# Patient Record
Sex: FEMALE | Race: WHITE | NOT HISPANIC OR LATINO | Employment: FULL TIME | URBAN - METROPOLITAN AREA
[De-identification: names, ages, dates, MRNs, and addresses within clinical notes are randomized per-mention and may not be internally consistent; named-entity substitution may affect disease eponyms.]

---

## 2018-11-28 ENCOUNTER — OFFICE VISIT (OUTPATIENT)
Dept: PRIMARY CARE CLINIC | Facility: CLINIC | Age: 43
End: 2018-11-28
Payer: COMMERCIAL

## 2018-11-28 VITALS
DIASTOLIC BLOOD PRESSURE: 88 MMHG | HEART RATE: 61 BPM | OXYGEN SATURATION: 98 % | RESPIRATION RATE: 18 BRPM | TEMPERATURE: 98 F | BODY MASS INDEX: 31.24 KG/M2 | WEIGHT: 187.5 LBS | SYSTOLIC BLOOD PRESSURE: 136 MMHG | HEIGHT: 65 IN

## 2018-11-28 DIAGNOSIS — G44.89 CHRONIC MIXED HEADACHE SYNDROME: ICD-10-CM

## 2018-11-28 DIAGNOSIS — G43.109 MIGRAINE WITH AURA AND WITHOUT STATUS MIGRAINOSUS, NOT INTRACTABLE: ICD-10-CM

## 2018-11-28 DIAGNOSIS — Z00.00 ROUTINE PHYSICAL EXAMINATION: ICD-10-CM

## 2018-11-28 DIAGNOSIS — Z13.6 ENCOUNTER FOR SCREENING FOR CARDIOVASCULAR DISORDERS: ICD-10-CM

## 2018-11-28 DIAGNOSIS — F41.9 ANXIETY: ICD-10-CM

## 2018-11-28 DIAGNOSIS — F33.0 MILD EPISODE OF RECURRENT MAJOR DEPRESSIVE DISORDER: Primary | ICD-10-CM

## 2018-11-28 DIAGNOSIS — Z12.31 ENCOUNTER FOR SCREENING MAMMOGRAM FOR BREAST CANCER: ICD-10-CM

## 2018-11-28 PROCEDURE — 99999 PR PBB SHADOW E&M-EST. PATIENT-LVL IV: CPT | Mod: PBBFAC,,, | Performed by: NURSE PRACTITIONER

## 2018-11-28 PROCEDURE — 99214 OFFICE O/P EST MOD 30 MIN: CPT | Mod: S$GLB,,, | Performed by: NURSE PRACTITIONER

## 2018-11-28 PROCEDURE — 3008F BODY MASS INDEX DOCD: CPT | Mod: CPTII,S$GLB,, | Performed by: NURSE PRACTITIONER

## 2018-11-28 RX ORDER — CLONAZEPAM 0.5 MG/1
0.5 TABLET ORAL 2 TIMES DAILY PRN
Qty: 30 TABLET | Refills: 0 | Status: SHIPPED | OUTPATIENT
Start: 2018-11-28 | End: 2020-07-30

## 2018-11-28 RX ORDER — SERTRALINE HYDROCHLORIDE 50 MG/1
50 TABLET, FILM COATED ORAL DAILY
Qty: 30 TABLET | Refills: 11 | Status: SHIPPED | OUTPATIENT
Start: 2018-11-28 | End: 2019-01-03

## 2018-11-28 RX ORDER — BUTALBITAL, ACETAMINOPHEN AND CAFFEINE 50; 325; 40 MG/1; MG/1; MG/1
1 TABLET ORAL EVERY 4 HOURS PRN
Qty: 30 TABLET | Refills: 0 | Status: SHIPPED | OUTPATIENT
Start: 2018-11-28 | End: 2018-12-28

## 2018-11-28 NOTE — PATIENT INSTRUCTIONS
Take half to 1 tablet of Klonopin nightly for sleep.  Get on a good sleep scheduled.  Go to sleep this same time every night waking up at the same time every morning.  Avoid caffeine after noon.  Start regular exercise routine at least 30 min a day 4 days a week.  Start Zoloft now.  Follow up in 2 weeks again see her feeling.    Fioricet for headaches-take only as needed.  Avoid taking daily in alternate with Excedrin migraine to prevent development of rebound headaches.  Complete CT of the head.  I will get back to you with the results within 24 hr of completion.

## 2018-11-28 NOTE — PROGRESS NOTES
Chief Complaint  Chief Complaint   Patient presents with    Annual Exam    Establish Care    Anxiety    Migraine       HPI  Florence Morin is a 43 y.o. female with multiple medical diagnoses as listed in the medical history and problem list that presents for dysphoric mood, anxiety, and worsening headaches.  Patient wishing to establish care at this time.  Due for mammogram.  No interest in flu vaccine at this time.    Anxiety, dysphoria-patient reports the onset of symptoms approximately 14 years ago after her  passed away suddenly.  With under the care of Psychiatry that time started on Lexapro and Ativan to be taken as needed.  States that she stopped due to the medication making her feel too sleepy.  Stopped at that time and reported management of her symptoms through coping mechanisms such as walking, getting out house, exercise.  Patient with a good support system.  Reports new onset of symptoms approximately 2 months ago with worsening of her mother who is currently dying of Alzheimer's disease. Symptoms worsening over the last 2 months.  States that she cannot sleep at all.  Difficulty initiating sleep and maintaining sleep.  Frequent waking him sleeps a total of 4 hr per night.  Reports intermittent dysphoria, crying.  Reports anhedonia withdrawal.  No panic attacks.  Has started going to the gym 4 days a week with no improvement symptoms.  Denies suicidal homicidal ideation.    Migraines-patient with history of migraines last migraine approximately 1 year ago into the occurrence of serial migraines over the past week.  States that the migraine qualities have changed and now they are triggered by smells.  Associated vision loss, blurred vision.  Left-sided primarily.  Associated nausea and vomiting.  Worsening over the past 2 days.  Has been taking ibuprofen over-the-counter with no improvement headaches.  Patient states that 1 year ago when she had the headache she went to the emergency room  because she thought she was having a stroke.  A CT was completed at that time was found to be normal.      PAST MEDICAL HISTORY:  History reviewed. No pertinent past medical history.    PAST SURGICAL HISTORY:  History reviewed. No pertinent surgical history.    SOCIAL HISTORY:  Social History     Socioeconomic History    Marital status: Single     Spouse name: Not on file    Number of children: Not on file    Years of education: Not on file    Highest education level: Not on file   Social Needs    Financial resource strain: Not on file    Food insecurity - worry: Not on file    Food insecurity - inability: Not on file    Transportation needs - medical: Not on file    Transportation needs - non-medical: Not on file   Occupational History    Not on file   Tobacco Use    Smoking status: Never Smoker   Substance and Sexual Activity    Alcohol use: No    Drug use: No    Sexual activity: Yes     Partners: Male   Other Topics Concern    Not on file   Social History Narrative    Not on file       FAMILY HISTORY:  Family History   Problem Relation Age of Onset    Ovarian cancer Cousin     Breast cancer Neg Hx     Colon cancer Neg Hx        ALLERGIES AND MEDICATIONS: updated and reviewed.  Review of patient's allergies indicates:  No Known Allergies  Current Outpatient Medications   Medication Sig Dispense Refill    butalbital-acetaminophen-caffeine -40 mg (FIORICET, ESGIC) -40 mg per tablet Take 1 tablet by mouth every 4 (four) hours as needed for Pain. 30 tablet 0    clonazePAM (KLONOPIN) 0.5 MG tablet Take 1 tablet (0.5 mg total) by mouth 2 (two) times daily as needed for Anxiety. 30 tablet 0    sertraline (ZOLOFT) 50 MG tablet Take 1 tablet (50 mg total) by mouth once daily. 30 tablet 11     No current facility-administered medications for this visit.          ROS  Review of Systems   Constitutional: Positive for activity change and unexpected weight change.   HENT: Negative for hearing  "loss, rhinorrhea and trouble swallowing.    Eyes: Positive for visual disturbance. Negative for discharge.   Respiratory: Positive for chest tightness. Negative for wheezing.    Cardiovascular: Positive for chest pain and palpitations.   Gastrointestinal: Positive for blood in stool and constipation. Negative for diarrhea and vomiting.   Endocrine: Negative for polydipsia and polyuria.   Genitourinary: Positive for menstrual problem. Negative for difficulty urinating, dysuria and hematuria.   Musculoskeletal: Positive for arthralgias and neck pain. Negative for joint swelling.   Neurological: Positive for weakness and headaches.   Psychiatric/Behavioral: Positive for dysphoric mood. Negative for confusion.         PHYSICAL EXAM  Vitals:    11/28/18 1404   BP: 136/88   BP Location: Right arm   Patient Position: Sitting   BP Method: Medium (Automatic)   Pulse: 61   Resp: 18   Temp: 98.3 °F (36.8 °C)   TempSrc: Oral   SpO2: 98%   Weight: 85 kg (187 lb 8 oz)   Height: 5' 5" (1.651 m)    Body mass index is 31.2 kg/m².  Weight: 85 kg (187 lb 8 oz)   Height: 5' 5" (165.1 cm)     Physical Exam   Constitutional: She is oriented to person, place, and time. She appears well-developed and well-nourished.   HENT:   Head: Normocephalic.   Right Ear: Tympanic membrane normal.   Left Ear: Tympanic membrane normal.   Mouth/Throat: Uvula is midline, oropharynx is clear and moist and mucous membranes are normal.   Eyes: Conjunctivae are normal.   Cardiovascular: Normal rate, regular rhythm, normal heart sounds and normal pulses.   No murmur heard.  Pulses:       Radial pulses are 2+ on the right side, and 2+ on the left side.   Pulmonary/Chest: Effort normal and breath sounds normal. She has no wheezes.   Abdominal: Soft. Bowel sounds are normal. There is no tenderness.   Musculoskeletal: She exhibits no edema.   Lymphadenopathy:     She has no cervical adenopathy.   Neurological: She is alert and oriented to person, place, and time. " She has normal strength. No cranial nerve deficit or sensory deficit. GCS eye subscore is 4. GCS verbal subscore is 5. GCS motor subscore is 6.   Skin: Skin is warm and dry. No rash noted.   Psychiatric: Her affect is labile. She exhibits a depressed mood.         Health Maintenance       Date Due Completion Date    TETANUS VACCINE 09/20/1995 ---    Mammogram 09/20/2017 ---    Influenza Vaccine 08/01/2018 ---    Pap Smear with HPV Cotest 05/25/2020 5/25/2017            Assessment & Plan    Florence was seen today for annual exam, establish care, anxiety and migraine.    Diagnoses and all orders for this visit:    Mild episode of recurrent major depressive disorder  -     sertraline (ZOLOFT) 50 MG tablet; Take 1 tablet (50 mg total) by mouth once daily.        -     clonazePAM (KLONOPIN) 0.5 MG tablet; Take 1 tablet (0.5 mg total) by mouth 2 (two) times daily as needed for Anxiety.  Take Klonopin nightly as needed for sleep.  Practice good sleep hygiene-go to sleep the same time every night, wake up at the same time every morning.  Avoid caffeine in the afternoon.  Avoid exercising in the late evening. I have discussed the common side effects of this medication and black box warnings (if applicable to this medication) with the patient and answered all of the questions they had at the time of this visit regarding this medication.  Follow-up in 2 weeks for re-evaluation of mood.    Chronic mixed headache syndrome    Migraine with aura and without status migrainosus, not intractable  -     CT Head Without Contrast; Future  -     butalbital-acetaminophen-caffeine -40 mg (FIORICET, ESGIC) -40 mg per tablet; Take 1 tablet by mouth every 4 (four) hours as needed for Pain. Instructed patient to avoid taking Fioricet daily and alternate with ibuprofen and/or Excedrin migraine.    Encounter for screening mammogram for breast cancer  -     Mammo Digital Screening Bilat without CA; Future    Anxiety    Routine  physical examination  -     CBC auto differential; Future  -     Comprehensive metabolic panel; Future  -     TSH; Future    Encounter for screening for cardiovascular disorders  -     Lipid panel; Future          Follow-up: Follow-up in about 2 weeks (around 12/12/2018).

## 2018-12-03 ENCOUNTER — PATIENT MESSAGE (OUTPATIENT)
Dept: PRIMARY CARE CLINIC | Facility: CLINIC | Age: 43
End: 2018-12-03

## 2018-12-03 DIAGNOSIS — F33.1 MODERATE EPISODE OF RECURRENT MAJOR DEPRESSIVE DISORDER: ICD-10-CM

## 2018-12-03 DIAGNOSIS — G47.00 INSOMNIA, UNSPECIFIED TYPE: Primary | ICD-10-CM

## 2018-12-03 PROBLEM — F33.9 EPISODE OF RECURRENT MAJOR DEPRESSIVE DISORDER: Status: ACTIVE | Noted: 2018-12-03

## 2018-12-03 RX ORDER — TRAZODONE HYDROCHLORIDE 50 MG/1
50 TABLET ORAL NIGHTLY
Qty: 30 TABLET | Refills: 11 | Status: SHIPPED | OUTPATIENT
Start: 2018-12-03 | End: 2020-07-30

## 2018-12-11 ENCOUNTER — TELEPHONE (OUTPATIENT)
Dept: PRIMARY CARE CLINIC | Facility: CLINIC | Age: 43
End: 2018-12-11

## 2018-12-11 NOTE — TELEPHONE ENCOUNTER
----- Message from Melania Martin sent at 12/11/2018  2:13 PM CST -----  Type:  Patient Returning Call    Who Called:  Patient   Who Left Message for Patient:  Cristopher  Does the patient know what this is regarding?:  results  Best Call Back Number:  [phone]  Additional Information:  Please call after 3pm when patient is off work    Thank you

## 2019-01-03 ENCOUNTER — TELEPHONE (OUTPATIENT)
Dept: PRIMARY CARE CLINIC | Facility: CLINIC | Age: 44
End: 2019-01-03

## 2019-01-03 ENCOUNTER — PATIENT MESSAGE (OUTPATIENT)
Dept: PRIMARY CARE CLINIC | Facility: CLINIC | Age: 44
End: 2019-01-03

## 2019-01-03 RX ORDER — PAROXETINE HYDROCHLORIDE 20 MG/1
20 TABLET, FILM COATED ORAL EVERY MORNING
Qty: 30 TABLET | Refills: 11 | Status: SHIPPED | OUTPATIENT
Start: 2019-01-03 | End: 2020-07-30

## 2020-07-03 DIAGNOSIS — Z12.31 ENCOUNTER FOR SCREENING MAMMOGRAM FOR BREAST CANCER: ICD-10-CM

## 2020-07-30 ENCOUNTER — TELEPHONE (OUTPATIENT)
Dept: PSYCHOLOGY | Facility: CLINIC | Age: 45
End: 2020-07-30

## 2020-07-30 ENCOUNTER — OFFICE VISIT (OUTPATIENT)
Dept: PRIMARY CARE CLINIC | Facility: CLINIC | Age: 45
End: 2020-07-30
Payer: MEDICAID

## 2020-07-30 VITALS
HEIGHT: 65 IN | SYSTOLIC BLOOD PRESSURE: 122 MMHG | DIASTOLIC BLOOD PRESSURE: 88 MMHG | OXYGEN SATURATION: 99 % | HEART RATE: 62 BPM | TEMPERATURE: 98 F | RESPIRATION RATE: 16 BRPM | WEIGHT: 205.25 LBS | BODY MASS INDEX: 34.2 KG/M2

## 2020-07-30 DIAGNOSIS — S16.1XXA ACUTE STRAIN OF NECK MUSCLE, INITIAL ENCOUNTER: ICD-10-CM

## 2020-07-30 DIAGNOSIS — F33.1 MODERATE EPISODE OF RECURRENT MAJOR DEPRESSIVE DISORDER: ICD-10-CM

## 2020-07-30 DIAGNOSIS — F43.21 GRIEF: Primary | ICD-10-CM

## 2020-07-30 PROCEDURE — 99999 PR PBB SHADOW E&M-EST. PATIENT-LVL V: ICD-10-PCS | Mod: PBBFAC,,, | Performed by: NURSE PRACTITIONER

## 2020-07-30 PROCEDURE — 99214 OFFICE O/P EST MOD 30 MIN: CPT | Mod: S$PBB,,, | Performed by: NURSE PRACTITIONER

## 2020-07-30 PROCEDURE — 99215 OFFICE O/P EST HI 40 MIN: CPT | Mod: PBBFAC,PN | Performed by: NURSE PRACTITIONER

## 2020-07-30 PROCEDURE — 99214 PR OFFICE/OUTPT VISIT, EST, LEVL IV, 30-39 MIN: ICD-10-PCS | Mod: S$PBB,,, | Performed by: NURSE PRACTITIONER

## 2020-07-30 PROCEDURE — 99999 PR PBB SHADOW E&M-EST. PATIENT-LVL V: CPT | Mod: PBBFAC,,, | Performed by: NURSE PRACTITIONER

## 2020-07-30 RX ORDER — CLONAZEPAM 0.5 MG/1
0.5 TABLET ORAL 2 TIMES DAILY PRN
Qty: 30 TABLET | Refills: 2 | Status: SHIPPED | OUTPATIENT
Start: 2020-07-30 | End: 2021-03-19

## 2020-07-30 RX ORDER — NAPROXEN 500 MG/1
500 TABLET ORAL 2 TIMES DAILY
Qty: 28 TABLET | Refills: 0 | Status: SHIPPED | OUTPATIENT
Start: 2020-07-30

## 2020-07-30 RX ORDER — CYCLOBENZAPRINE HCL 5 MG
5 TABLET ORAL 3 TIMES DAILY PRN
Qty: 30 TABLET | Refills: 0 | Status: SHIPPED | OUTPATIENT
Start: 2020-07-30 | End: 2020-08-09

## 2020-07-30 RX ORDER — PAROXETINE HYDROCHLORIDE 20 MG/1
20 TABLET, FILM COATED ORAL EVERY MORNING
Qty: 30 TABLET | Refills: 2 | Status: SHIPPED | OUTPATIENT
Start: 2020-07-30 | End: 2021-03-19

## 2020-07-30 NOTE — PROGRESS NOTES
"Chief Complaint  Chief Complaint   Patient presents with    Anxiety     Patient recently lost mother to COVID.     Tingling     Patient reports tingling to bilateral arms since fall x 3 months ago     Fatigue       HPI    Florence Morin is a 44 y.o. female that presents for anxiety.    Patient recently lost her mother early in July from COVID 19. Has a history of depression and anxiety in the past was perviously on Paxil and klonopin but had to stop her paxil 20 mg and klonopin due to insurance lapse. Generally felt better at the time and felt okay to stop. Does report owrsening of mood after getting off of the paxil. Increased rage. Super irritable and continued as such through the year. Presents to clinic today reporting she is "all over the place". Feels overwhelmed. Disorganized in thoughts. Hopeless. Dealing with dypshoria. Cannot rest and relax at all. Has been smoking marijuana to go to sleep and sleeping 4-5 hours per night. No panic attacks. Lives with her boyfriend who is a good support system. Goes for walks with no improvement. Dad currently sick with COVID in the hospital with increased stressors. Reports that paxil offered good control of her anxiety in the past. Was able to focus better at the time, was much more functional. Cannot get out of bed due to lack of motivation. Patient denies SI/HI. No previous side effects on paxil but does report weight gain.    PAST MEDICAL HISTORY:  Past Medical History:   Diagnosis Date    Anxiety        PAST SURGICAL HISTORY:  History reviewed. No pertinent surgical history.    SOCIAL HISTORY:  Social History     Socioeconomic History    Marital status: Single     Spouse name: Not on file    Number of children: Not on file    Years of education: Not on file    Highest education level: Not on file   Occupational History    Not on file   Social Needs    Financial resource strain: Not on file    Food insecurity     Worry: Not on file     Inability: Not on " file    Transportation needs     Medical: Not on file     Non-medical: Not on file   Tobacco Use    Smoking status: Never Smoker    Smokeless tobacco: Never Used   Substance and Sexual Activity    Alcohol use: No    Drug use: No    Sexual activity: Yes     Partners: Male   Lifestyle    Physical activity     Days per week: Not on file     Minutes per session: Not on file    Stress: Not on file   Relationships    Social connections     Talks on phone: Not on file     Gets together: Not on file     Attends Bahai service: Not on file     Active member of club or organization: Not on file     Attends meetings of clubs or organizations: Not on file     Relationship status: Not on file   Other Topics Concern    Not on file   Social History Narrative    Not on file       FAMILY HISTORY:  Family History   Problem Relation Age of Onset    Ovarian cancer Cousin     Breast cancer Neg Hx     Colon cancer Neg Hx        ALLERGIES AND MEDICATIONS: updated and reviewed.  Review of patient's allergies indicates:  No Known Allergies  Current Outpatient Medications   Medication Sig Dispense Refill    clonazePAM (KLONOPIN) 0.5 MG tablet Take 1 tablet (0.5 mg total) by mouth 2 (two) times daily as needed for Anxiety. 30 tablet 2    cyclobenzaprine (FLEXERIL) 5 MG tablet Take 1 tablet (5 mg total) by mouth 3 (three) times daily as needed for Muscle spasms. 30 tablet 0    naproxen (NAPROSYN) 500 MG tablet Take 1 tablet (500 mg total) by mouth 2 (two) times daily. 28 tablet 0    paroxetine (PAXIL) 20 MG tablet Take 1 tablet (20 mg total) by mouth every morning. 30 tablet 2     No current facility-administered medications for this visit.          ROS  Review of Systems   Constitutional: Positive for fatigue. Negative for chills and fever.   HENT: Negative for ear pain, postnasal drip and sinus pain.    Respiratory: Negative for cough and shortness of breath.    Cardiovascular: Negative for chest pain.  "  Gastrointestinal: Negative for diarrhea, nausea and vomiting.   Neurological: Positive for headaches.   Psychiatric/Behavioral: Positive for agitation, decreased concentration, dysphoric mood and sleep disturbance. Negative for suicidal ideas. The patient is nervous/anxious.            PHYSICAL EXAM  Vitals:    07/30/20 0802   BP: 122/88   BP Location: Right arm   Patient Position: Sitting   BP Method: Large (Manual)   Pulse: 62   Resp: 16   Temp: 98 °F (36.7 °C)   TempSrc: Oral   SpO2: 99%   Weight: 93.1 kg (205 lb 4 oz)   Height: 5' 5" (1.651 m)    Body mass index is 34.16 kg/m².  Weight: 93.1 kg (205 lb 4 oz)   Height: 5' 5" (165.1 cm)     Physical Exam  Constitutional:       Appearance: She is well-developed.   HENT:      Head: Normocephalic.      Right Ear: Tympanic membrane normal.      Left Ear: Tympanic membrane normal.      Mouth/Throat:      Pharynx: Uvula midline.   Eyes:      Conjunctiva/sclera: Conjunctivae normal.   Cardiovascular:      Rate and Rhythm: Normal rate and regular rhythm.      Pulses: Normal pulses.           Radial pulses are 2+ on the right side and 2+ on the left side.      Heart sounds: Normal heart sounds. No murmur.      Comments: No LE swelling noted  Pulmonary:      Effort: Pulmonary effort is normal.      Breath sounds: Normal breath sounds. No wheezing.   Abdominal:      General: Bowel sounds are normal.      Palpations: Abdomen is soft.      Tenderness: There is no abdominal tenderness.   Lymphadenopathy:      Cervical: No cervical adenopathy.   Skin:     General: Skin is warm and dry.      Findings: No rash.   Neurological:      Mental Status: She is alert and oriented to person, place, and time.   Psychiatric:         Attention and Perception: Attention normal.         Mood and Affect: Mood is anxious and depressed.         Behavior: Behavior is slowed and withdrawn.           Health Maintenance       Date Due Completion Date    HIV Screening 09/20/1992 ---    TETANUS " VACCINE 09/20/1995 ---    Cervical Cancer Screening 05/25/2020 5/25/2017    Influenza Vaccine (1) 09/01/2020 12/6/2018    Mammogram 12/05/2020 12/5/2018            Assessment & Plan    Problem List Items Addressed This Visit        Unprioritized    Episode of recurrent major depressive disorder    Relevant Medications    clonazePAM (KLONOPIN) 0.5 MG tablet    paroxetine (PAXIL) 20 MG tablet      Other Visit Diagnoses     Grief    -  Primary    Relevant Orders    Ambulatory referral/consult to Psychology    Acute strain of neck muscle, initial encounter        Relevant Medications    cyclobenzaprine (FLEXERIL) 5 MG tablet    naproxen (NAPROSYN) 500 MG tablet          Follow-up: Follow up in about 2 weeks (around 8/13/2020).    Karen Alonso    Medication List with Changes/Refills   New Medications    CLONAZEPAM (KLONOPIN) 0.5 MG TABLET    Take 1 tablet (0.5 mg total) by mouth 2 (two) times daily as needed for Anxiety.    CYCLOBENZAPRINE (FLEXERIL) 5 MG TABLET    Take 1 tablet (5 mg total) by mouth 3 (three) times daily as needed for Muscle spasms.    NAPROXEN (NAPROSYN) 500 MG TABLET    Take 1 tablet (500 mg total) by mouth 2 (two) times daily.    PAROXETINE (PAXIL) 20 MG TABLET    Take 1 tablet (20 mg total) by mouth every morning.   Discontinued Medications    CLONAZEPAM (KLONOPIN) 0.5 MG TABLET    Take 1 tablet (0.5 mg total) by mouth 2 (two) times daily as needed for Anxiety.    PAROXETINE (PAXIL) 20 MG TABLET    Take 1 tablet (20 mg total) by mouth every morning.    TRAZODONE (DESYREL) 50 MG TABLET    Take 1 tablet (50 mg total) by mouth every evening.

## 2020-07-30 NOTE — TELEPHONE ENCOUNTER
----- Message from Vanna Reyes sent at 7/30/2020 12:59 PM CDT -----  Needs appt with Janet Stewart Thanks

## 2020-10-12 ENCOUNTER — PATIENT MESSAGE (OUTPATIENT)
Dept: PRIMARY CARE CLINIC | Facility: CLINIC | Age: 45
End: 2020-10-12

## 2021-03-17 ENCOUNTER — PATIENT MESSAGE (OUTPATIENT)
Dept: PRIMARY CARE CLINIC | Facility: CLINIC | Age: 46
End: 2021-03-17

## 2021-03-19 ENCOUNTER — OFFICE VISIT (OUTPATIENT)
Dept: PRIMARY CARE CLINIC | Facility: CLINIC | Age: 46
End: 2021-03-19
Payer: MEDICAID

## 2021-03-19 DIAGNOSIS — F33.1 MODERATE EPISODE OF RECURRENT MAJOR DEPRESSIVE DISORDER: Primary | ICD-10-CM

## 2021-03-19 PROCEDURE — 99213 PR OFFICE/OUTPT VISIT, EST, LEVL III, 20-29 MIN: ICD-10-PCS | Mod: 95,,, | Performed by: NURSE PRACTITIONER

## 2021-03-19 PROCEDURE — 99213 OFFICE O/P EST LOW 20 MIN: CPT | Mod: 95,,, | Performed by: NURSE PRACTITIONER

## 2021-03-19 RX ORDER — ESCITALOPRAM OXALATE 10 MG/1
10 TABLET ORAL DAILY
Qty: 30 TABLET | Refills: 2 | Status: SHIPPED | OUTPATIENT
Start: 2021-03-19 | End: 2021-11-02

## 2021-03-19 RX ORDER — CLONAZEPAM 0.5 MG/1
0.5 TABLET ORAL DAILY PRN
Qty: 30 TABLET | Refills: 2 | Status: SHIPPED | OUTPATIENT
Start: 2021-03-19 | End: 2023-08-30 | Stop reason: SDUPTHER

## 2021-03-19 RX ORDER — VORTIOXETINE 5 MG/1
5 TABLET, FILM COATED ORAL DAILY
Qty: 30 TABLET | Refills: 0 | Status: SHIPPED | OUTPATIENT
Start: 2021-03-19 | End: 2021-03-19

## 2021-03-22 ENCOUNTER — PATIENT OUTREACH (OUTPATIENT)
Dept: ADMINISTRATIVE | Facility: HOSPITAL | Age: 46
End: 2021-03-22

## 2021-04-16 ENCOUNTER — PATIENT MESSAGE (OUTPATIENT)
Dept: RESEARCH | Facility: HOSPITAL | Age: 46
End: 2021-04-16

## 2021-11-01 ENCOUNTER — PATIENT MESSAGE (OUTPATIENT)
Dept: PRIMARY CARE CLINIC | Facility: CLINIC | Age: 46
End: 2021-11-01
Payer: MEDICAID

## 2021-11-02 RX ORDER — ESCITALOPRAM OXALATE 10 MG/1
10 TABLET ORAL DAILY
Qty: 30 TABLET | Refills: 2 | Status: SHIPPED | OUTPATIENT
Start: 2021-11-02 | End: 2023-08-30

## 2022-04-01 ENCOUNTER — PATIENT MESSAGE (OUTPATIENT)
Dept: PRIMARY CARE CLINIC | Facility: CLINIC | Age: 47
End: 2022-04-01
Payer: MEDICAID

## 2022-06-28 ENCOUNTER — PATIENT OUTREACH (OUTPATIENT)
Dept: ADMINISTRATIVE | Facility: HOSPITAL | Age: 47
End: 2022-06-28
Payer: MEDICAID

## 2023-01-31 ENCOUNTER — HOSPITAL ENCOUNTER (OUTPATIENT)
Facility: HOSPITAL | Age: 48
Discharge: HOME | End: 2023-02-01
Attending: EMERGENCY MEDICINE | Admitting: INTERNAL MEDICINE

## 2023-01-31 DIAGNOSIS — I49.9 ABNORMAL HEART RHYTHM: ICD-10-CM

## 2023-01-31 DIAGNOSIS — I48.91 ATRIAL FIBRILLATION: ICD-10-CM

## 2023-01-31 DIAGNOSIS — R07.9 CHEST PAIN: ICD-10-CM

## 2023-01-31 DIAGNOSIS — I48.92 ATRIAL FLUTTER WITH RAPID VENTRICULAR RESPONSE: Primary | ICD-10-CM

## 2023-01-31 LAB
ALBUMIN SERPL BCP-MCNC: 3.7 G/DL (ref 3.5–5.2)
ALP SERPL-CCNC: 64 U/L (ref 55–135)
ALT SERPL W/O P-5'-P-CCNC: 21 U/L (ref 10–44)
ANION GAP SERPL CALC-SCNC: 9 MMOL/L (ref 8–16)
AST SERPL-CCNC: 23 U/L (ref 10–40)
BASOPHILS # BLD AUTO: 0.01 K/UL (ref 0–0.2)
BASOPHILS NFR BLD: 0.1 % (ref 0–1.9)
BILIRUB SERPL-MCNC: 0.5 MG/DL (ref 0.1–1)
BNP SERPL-MCNC: 547 PG/ML (ref 0–99)
BUN SERPL-MCNC: 15 MG/DL (ref 6–20)
CALCIUM SERPL-MCNC: 9.1 MG/DL (ref 8.7–10.5)
CHLORIDE SERPL-SCNC: 107 MMOL/L (ref 95–110)
CO2 SERPL-SCNC: 21 MMOL/L (ref 23–29)
CREAT SERPL-MCNC: 0.8 MG/DL (ref 0.5–1.4)
DIFFERENTIAL METHOD: ABNORMAL
EOSINOPHIL # BLD AUTO: 0.1 K/UL (ref 0–0.5)
EOSINOPHIL NFR BLD: 1.4 % (ref 0–8)
ERYTHROCYTE [DISTWIDTH] IN BLOOD BY AUTOMATED COUNT: 20 % (ref 11.5–14.5)
EST. GFR  (NO RACE VARIABLE): >60 ML/MIN/1.73 M^2
GLUCOSE SERPL-MCNC: 96 MG/DL (ref 70–110)
HCT VFR BLD AUTO: 36.6 % (ref 37–48.5)
HGB BLD-MCNC: 11.2 G/DL (ref 12–16)
IMM GRANULOCYTES # BLD AUTO: 0.01 K/UL (ref 0–0.04)
IMM GRANULOCYTES NFR BLD AUTO: 0.1 % (ref 0–0.5)
LYMPHOCYTES # BLD AUTO: 3.4 K/UL (ref 1–4.8)
LYMPHOCYTES NFR BLD: 42.5 % (ref 18–48)
MAGNESIUM SERPL-MCNC: 2.1 MG/DL (ref 1.6–2.6)
MCH RBC QN AUTO: 22.7 PG (ref 27–31)
MCHC RBC AUTO-ENTMCNC: 30.6 G/DL (ref 32–36)
MCV RBC AUTO: 74 FL (ref 82–98)
MONOCYTES # BLD AUTO: 0.5 K/UL (ref 0.3–1)
MONOCYTES NFR BLD: 6.4 % (ref 4–15)
NEUTROPHILS # BLD AUTO: 4 K/UL (ref 1.8–7.7)
NEUTROPHILS NFR BLD: 49.5 % (ref 38–73)
NRBC BLD-RTO: 0 /100 WBC
PLATELET # BLD AUTO: 245 K/UL (ref 150–450)
PMV BLD AUTO: ABNORMAL FL (ref 9.2–12.9)
POTASSIUM SERPL-SCNC: 4.2 MMOL/L (ref 3.5–5.1)
PROT SERPL-MCNC: 7.2 G/DL (ref 6–8.4)
RBC # BLD AUTO: 4.93 M/UL (ref 4–5.4)
SODIUM SERPL-SCNC: 137 MMOL/L (ref 136–145)
TROPONIN I SERPL HS-MCNC: 34.6 PG/ML (ref 0–14.9)
TROPONIN I SERPL HS-MCNC: 43.1 PG/ML (ref 0–14.9)
TSH SERPL DL<=0.005 MIU/L-ACNC: 1.4 UIU/ML (ref 0.34–5.6)
WBC # BLD AUTO: 8.08 K/UL (ref 3.9–12.7)

## 2023-01-31 PROCEDURE — 83880 ASSAY OF NATRIURETIC PEPTIDE: CPT | Performed by: EMERGENCY MEDICINE

## 2023-01-31 PROCEDURE — G0378 HOSPITAL OBSERVATION PER HR: HCPCS

## 2023-01-31 PROCEDURE — 84443 ASSAY THYROID STIM HORMONE: CPT | Performed by: EMERGENCY MEDICINE

## 2023-01-31 PROCEDURE — 63600175 PHARM REV CODE 636 W HCPCS: Performed by: EMERGENCY MEDICINE

## 2023-01-31 PROCEDURE — 84484 ASSAY OF TROPONIN QUANT: CPT | Performed by: EMERGENCY MEDICINE

## 2023-01-31 PROCEDURE — 80053 COMPREHEN METABOLIC PANEL: CPT | Performed by: EMERGENCY MEDICINE

## 2023-01-31 PROCEDURE — 25000003 PHARM REV CODE 250: Performed by: EMERGENCY MEDICINE

## 2023-01-31 PROCEDURE — 85025 COMPLETE CBC W/AUTO DIFF WBC: CPT | Performed by: EMERGENCY MEDICINE

## 2023-01-31 PROCEDURE — 83735 ASSAY OF MAGNESIUM: CPT | Performed by: EMERGENCY MEDICINE

## 2023-01-31 PROCEDURE — 99285 EMERGENCY DEPT VISIT HI MDM: CPT | Mod: 25

## 2023-01-31 PROCEDURE — 96366 THER/PROPH/DIAG IV INF ADDON: CPT

## 2023-01-31 PROCEDURE — 96365 THER/PROPH/DIAG IV INF INIT: CPT

## 2023-01-31 RX ORDER — TALC
6 POWDER (GRAM) TOPICAL NIGHTLY PRN
Status: CANCELLED | OUTPATIENT
Start: 2023-01-31

## 2023-01-31 RX ORDER — ENOXAPARIN SODIUM 100 MG/ML
40 INJECTION SUBCUTANEOUS EVERY 24 HOURS
Status: CANCELLED | OUTPATIENT
Start: 2023-01-31

## 2023-01-31 RX ORDER — DILTIAZEM HYDROCHLORIDE 5 MG/ML
5 INJECTION INTRAVENOUS
Status: COMPLETED | OUTPATIENT
Start: 2023-01-31 | End: 2023-01-31

## 2023-01-31 RX ORDER — IBUPROFEN 200 MG
24 TABLET ORAL
Status: CANCELLED | OUTPATIENT
Start: 2023-01-31

## 2023-01-31 RX ORDER — ACETAMINOPHEN 325 MG/1
650 TABLET ORAL EVERY 4 HOURS PRN
Status: CANCELLED | OUTPATIENT
Start: 2023-01-31

## 2023-01-31 RX ORDER — METOPROLOL SUCCINATE 25 MG/1
12.5 TABLET, EXTENDED RELEASE ORAL 2 TIMES DAILY
COMMUNITY
End: 2023-02-01 | Stop reason: SDUPTHER

## 2023-01-31 RX ORDER — METOPROLOL SUCCINATE 25 MG/1
25 TABLET, EXTENDED RELEASE ORAL 2 TIMES DAILY
Status: CANCELLED | OUTPATIENT
Start: 2023-01-31

## 2023-01-31 RX ORDER — IBUPROFEN 200 MG
16 TABLET ORAL
Status: CANCELLED | OUTPATIENT
Start: 2023-01-31

## 2023-01-31 RX ORDER — GLUCAGON 1 MG
1 KIT INJECTION
Status: CANCELLED | OUTPATIENT
Start: 2023-01-31

## 2023-01-31 RX ORDER — SODIUM CHLORIDE, SODIUM LACTATE, POTASSIUM CHLORIDE, CALCIUM CHLORIDE 600; 310; 30; 20 MG/100ML; MG/100ML; MG/100ML; MG/100ML
500 INJECTION, SOLUTION INTRAVENOUS
Status: COMPLETED | OUTPATIENT
Start: 2023-01-31 | End: 2023-01-31

## 2023-01-31 RX ORDER — NALOXONE HCL 0.4 MG/ML
0.02 VIAL (ML) INJECTION
Status: CANCELLED | OUTPATIENT
Start: 2023-01-31

## 2023-01-31 RX ORDER — AMIODARONE HYDROCHLORIDE 200 MG/1
200 TABLET ORAL 2 TIMES DAILY
COMMUNITY
End: 2023-08-30

## 2023-01-31 RX ORDER — AMIODARONE HYDROCHLORIDE 200 MG/1
200 TABLET ORAL 2 TIMES DAILY
Status: CANCELLED | OUTPATIENT
Start: 2023-01-31

## 2023-01-31 RX ORDER — SODIUM CHLORIDE 0.9 % (FLUSH) 0.9 %
10 SYRINGE (ML) INJECTION EVERY 12 HOURS PRN
Status: CANCELLED | OUTPATIENT
Start: 2023-01-31

## 2023-01-31 RX ORDER — ASPIRIN 325 MG
325 TABLET ORAL
Status: COMPLETED | OUTPATIENT
Start: 2023-01-31 | End: 2023-01-31

## 2023-01-31 RX ADMIN — ASPIRIN 325 MG ORAL TABLET 325 MG: 325 PILL ORAL at 01:01

## 2023-01-31 RX ADMIN — SODIUM CHLORIDE, SODIUM LACTATE, POTASSIUM CHLORIDE, AND CALCIUM CHLORIDE 500 ML: .6; .31; .03; .02 INJECTION, SOLUTION INTRAVENOUS at 05:01

## 2023-01-31 RX ADMIN — DILTIAZEM HYDROCHLORIDE 15 MG/HR: 100 INJECTION, POWDER, LYOPHILIZED, FOR SOLUTION INTRAVENOUS at 09:01

## 2023-01-31 RX ADMIN — DILTIAZEM HYDROCHLORIDE 5 MG/HR: 100 INJECTION, POWDER, LYOPHILIZED, FOR SOLUTION INTRAVENOUS at 01:01

## 2023-01-31 RX ADMIN — DILTIAZEM HYDROCHLORIDE 5 MG: 5 INJECTION INTRAVENOUS at 01:01

## 2023-01-31 NOTE — ED PROVIDER NOTES
Encounter Date: 1/31/2023       History     Chief Complaint   Patient presents with    Palpitations     Hx of a fib, on elliquis     47-year-old female with history of hypertension, atrial fibrillation on Eliquis.  Patient presents emergency department with intermittent palpitations since Saturday.  Patient states that her symptoms had subsided however return today while at work.  Patient did experience midsternal chest pressure.  Denies nausea, vomiting, no recent illness.  Upon arrival emergency department patient found with atrial flutter with rates of 150s with rapid ventricular response.  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  Currently works here in [place].    Review of patient's allergies indicates:  No Known Allergies  No past medical history on file.  No past surgical history on file.  No family history on file.     Review of Systems   Constitutional:  Negative for fever.   HENT:  Negative for sore throat.    Respiratory:  Negative for shortness of breath.    Cardiovascular:  Positive for chest pain and palpitations.   Gastrointestinal:  Negative for abdominal pain, nausea and vomiting.   Genitourinary:  Negative for dysuria.   Musculoskeletal:  Negative for back pain.   Skin:  Negative for rash.   Neurological:  Negative for weakness.   Hematological:  Does not bruise/bleed easily.     Physical Exam     Initial Vitals [01/31/23 1230]   BP Pulse Resp Temp SpO2   (!) 142/97 (!) 158 18 98.6 °F (37 °C) 100 %      MAP       --         Physical Exam    Nursing note and vitals reviewed.  Constitutional: She appears well-developed and well-nourished.   HENT:   Head: Normocephalic and atraumatic.   Nose: Nose normal.   Mouth/Throat: Oropharynx is clear and moist.   Eyes: Conjunctivae and EOM are normal. Pupils are equal, round, and reactive to light.   Neck: Neck supple. No thyromegaly present. No tracheal deviation present.   Normal range of motion.  Cardiovascular:  Normal  heart sounds and intact distal pulses.     Exam reveals no gallop and no friction rub.       No murmur heard.  Tachycardia irregular regular   Pulmonary/Chest: Breath sounds normal. No stridor. No respiratory distress.   Abdominal: Abdomen is soft. Bowel sounds are normal. She exhibits no mass. There is no rebound and no guarding.   Musculoskeletal:         General: No edema. Normal range of motion.      Cervical back: Normal range of motion and neck supple.     Lymphadenopathy:     She has no cervical adenopathy.   Neurological: She is alert and oriented to person, place, and time. She has normal strength and normal reflexes. GCS score is 15. GCS eye subscore is 4. GCS verbal subscore is 5. GCS motor subscore is 6.   Skin: Skin is warm and dry. Capillary refill takes less than 2 seconds.   Psychiatric: She has a normal mood and affect.       ED Course   Procedures  Labs Reviewed   CBC W/ AUTO DIFFERENTIAL - Abnormal; Notable for the following components:       Result Value    Hemoglobin 11.2 (*)     Hematocrit 36.6 (*)     MCV 74 (*)     MCH 22.7 (*)     MCHC 30.6 (*)     RDW 20.0 (*)     All other components within normal limits   COMPREHENSIVE METABOLIC PANEL - Abnormal; Notable for the following components:    CO2 21 (*)     All other components within normal limits   TROPONIN I HIGH SENSITIVITY - Abnormal; Notable for the following components:    Troponin I High Sensitivity 34.6 (*)     All other components within normal limits   B-TYPE NATRIURETIC PEPTIDE - Abnormal; Notable for the following components:     (*)     All other components within normal limits   MAGNESIUM   TSH   MAGNESIUM   TROPONIN I HIGH SENSITIVITY        ECG Results              EKG 12-lead (In process)  Result time 01/31/23 14:50:45      In process by Interface, Lab In [facility] (01/31/23 14:50:45)                   Narrative:    Test Reason : R07.9,    Vent. Rate : 157 BPM     Atrial Rate : 314 BPM     P-R Int : 000 ms          QRS  Dur : 070 ms      QT Int : 326 ms       P-R-T Axes : 243 -04 -56 degrees     QTc Int : 527 ms    Atrial flutter with 2:1 A-V conduction  Nonspecific ST and T wave abnormality  Abnormal ECG  No previous ECGs available    Referred By: ANNE   SELF           Confirmed By:                                   Imaging Results              X-Ray Chest 1 View (Final result)  Result time 01/31/23 12:57:06   Procedure changed from X-Ray Chest AP Portable     Final result by Mariela Burton MD (01/31/23 12:57:06)                   Narrative:    XR CHEST 1 VIEW    CLINICAL HISTORY:  47 years Female Chest Pain    COMPARISON: None    FINDINGS: Cardiomediastinal silhouette is within normal limits. Lungs are normally expanded with no airspace consolidation. No pleural effusion or pneumothorax. No acute osseous abnormality.    IMPRESSION: No acute pulmonary process.    Electronically signed by:  Mariela Burton MD  1/31/2023 12:57 PM [facility] Workstation: [id number]                                     Medications   diltiaZEM 100 mg in dextrose 5% 100 mL IVPB (ready to mix system) (titrating) (10 mg/hr Intravenous Rate/Dose Change 1/31/23 2750)   lactated ringers infusion (has no administration in time range)   aspirin tablet 325 mg (325 mg Oral Given 1/31/23 1335)   diltiaZEM injection 5 mg (5 mg Intravenous Given 1/31/23 1338)     Medical Decision Making:   Initial Assessment:   47-year-old female with history of hypertension, atrial fibrillation on Eliquis.  Patient presents emergency department with intermittent palpitations since Saturday.  Patient states that her symptoms had subsided however return today while at work.  Patient did experience midsternal chest pressure.  Denies nausea, vomiting, no recent illness.  Upon arrival emergency department patient found with atrial flutter with rates of 150s with rapid ventricular response.  Patient states cardiologist is in [facility].  Currently works here in  [employer].    Differential Diagnosis:   Atrial fibrillation rapid ventricular response, atrial atrial flutter with RVR, electrolyte abnormality, ACS  Clinical Tests:   Lab Tests: Ordered and Reviewed  Radiological Study: Ordered and Reviewed  Medical Tests: Ordered and Reviewed          Attending Attestation:         Attending Critical Care:   Critical Care Times:   Direct Patient Care (initial evaluation, reassessments, and time considering the case)................................................................30 minutes.   Additional History from reviewing old medical records or taking additional history from the family, EMS, PCP, etc.......................10 minutes.   Ordering, Reviewing, and Interpreting Diagnostic Studies...............................................................................................................10 minutes.   Documentation..................................................................................................................................................................................10 minutes.   Consultation with other Physicians. .................................................................................................................................................10 minutes.   Consultation with the patient's family directly relating to the patient's condition, care, and DNR status (when patient unable)......10 minutes.   ==============================================================  Total Critical Care Time - exclusive of procedural time: 80 minutes.  ==============================================================  Critical care was necessary to treat or prevent imminent or life-threatening deterioration of the following conditions: cardiac arrhythmia.   Critical care was time spent personally by me on the following activities: obtaining history from patient or relative, examination of patient, review of x-rays / CT sent with the patient,  review of old charts, ordering lab, x-rays, and/or EKG, development of treatment plan with patient or relative, ordering and performing treatments and interventions, evaluation of patient's response to treatment, discussion with consultants and re-evaluation of patient's conition.   Critical Care Condition: potentially life-threatening         ED Course as of 01/31/23 1631   Tue Jan 31, 2023   1612 Patient seen evaluated emergency department.  Patient found with atrial flutter with rapid ventricular response upon arrival to the emergency department with heart rate of 158.  Patient states has been taking metoprolol all intermittently remains on Eliquis.  Patient did have episode of chest tightness and shortness of breath associated with this.  Per workup in emergency department patient found with troponin of 34.  .  Otherwise magnesium 2.1, potassium 4.2 chemistries unremarkable.  Chest x-ray showed no evidence of acute pulmonary pathology.  Patient was began on Cardizem Cardizem infusion with bolus.  Will be admitted to Hospital Medicine.  Patient remained hemodynamically adequate.  Aspirin therapy given.  Patient remains on Eliquis. [RM]      ED Course User Index  [RM] Jaxson Hernandez MD                 Clinical Impression:   Final diagnoses:  [R07.9] Chest pain  [I48.92] Atrial flutter with rapid ventricular response (Primary)        ED Disposition Condition    Observation                 Jaxson Hernandez MD  01/31/23 1612       Jaxson Hernandez MD  01/31/23 1631

## 2023-01-31 NOTE — Clinical Note
Diagnosis: Atrial flutter with rapid ventricular response [[id number]]   Future Attending Provider: WILDER IRELAND [[id number]]   Admitting Provider:: WILDER IRELAND [[id number]]

## 2023-01-31 NOTE — HPI
ED note  47-year-old female with history of hypertension, atrial fibrillation on Eliquis.  Patient presents emergency department with intermittent palpitations since Saturday.  Patient states that her symptoms had subsided however return today while at work.  Patient did experience midsternal chest pressure.  Denies nausea, vomiting, no recent illness.  Upon arrival emergency department patient found with atrial flutter with rates of 150s with rapid ventricular response.  Patient states cardiologist is in [place] at [facility].  Currently works here in [employer].     1/31/2023  Ms Hardy has had significant difficulty regulating her A flutter and medications.

## 2023-01-31 NOTE — H&P
[facility] - Emergency Dept  Hospital Medicine  History & Physical    Patient Name: Florence Hardy  MRN: [id number]  Patient Class: OP- Observation  Admission Date: 1/31/2023  Attending Physician: Willie Chen MD  Primary Care Provider: Karen Alonso NP         Patient information was obtained from patient, relative(s), past medical records and ER records.     Subjective:     Principal Problem:Atrial flutter, paroxysmal    Chief Complaint:   Chief Complaint   Patient presents with    Palpitations     Hx of a fib, on elliquis        HPI: ED note  47-year-old female with history of hypertension, atrial fibrillation on Eliquis.  Patient presents emergency department with intermittent palpitations since Saturday.  Patient states that her symptoms had subsided however return today while at work.  Patient did experience midsternal chest pressure.  Denies nausea, vomiting, no recent illness.  Upon arrival emergency department patient found with atrial flutter with rates of 150s with rapid ventricular response.  Patient states cardiologist is in [place] at [facility].  Currently works here in [facility].     1/31/2023  Ms Hardy has had significant difficulty regulating her A flutter and medications.       No past medical history on file.    No past surgical history on file.    Review of patient's allergies indicates:  No Known Allergies    No current facility-administered medications on file prior to encounter.     Current Outpatient Medications on File Prior to Encounter   Medication Sig    amiodarone (PACERONE) 200 MG Tab Take 200 mg by mouth 2 (two) times daily.    apixaban (ELIQUIS) 5 mg Tab Take 5 mg by mouth 2 (two) times daily.    metoprolol succinate (TOPROL-XL) 25 MG 24 hr tablet Take 12.5 mg by mouth 2 (two) times daily.     Family History    None       Tobacco Use    Smoking status: Not on file    Smokeless tobacco: Not on file   Substance and Sexual Activity     Alcohol use: Not on file    Drug use: Not on file    Sexual activity: Not on file     Review of Systems   Constitutional:  Positive for activity change, diaphoresis and fatigue. Negative for chills and fever.   HENT: Negative.     Eyes: Negative.    Respiratory:  Positive for chest tightness and shortness of breath.    Cardiovascular:  Positive for palpitations. Negative for chest pain.   Gastrointestinal: Negative.    Endocrine: Positive for heat intolerance.   Genitourinary: Negative.    Musculoskeletal:  Positive for myalgias.   Skin: Negative.    Allergic/Immunologic: Negative.    Neurological:  Positive for weakness.   Hematological: Negative.    Psychiatric/Behavioral:  Positive for dysphoric mood. The patient is nervous/anxious.    Objective:     Vital Signs (Most Recent):  Temp: 98.6 °F (37 °C) (01/31/23 1230)  Pulse: (!) 152 (01/31/23 1600)  Resp: 18 (01/31/23 1230)  BP: 133/77 (01/31/23 1541)  SpO2: 100 % (01/31/23 1600)   Vital Signs (24h Range):  Temp:  [98.6 °F (37 °C)] 98.6 °F (37 °C)  Pulse:  [151-158] 152  Resp:  [18] 18  SpO2:  [99 %-100 %] 100 %  BP: (104-142)/(66-97) 133/77     Weight: 90.7 kg (200 lb)  Body mass index is 35.43 kg/m².    Physical Exam  Vitals and nursing note reviewed.   Constitutional:       General: She is not in acute distress.     Appearance: She is diaphoretic. She is not ill-appearing.   HENT:      Head: Normocephalic and atraumatic.      Nose: Nose normal.      Mouth/Throat:      Mouth: Mucous membranes are moist.   Eyes:      Extraocular Movements: Extraocular movements intact.      Pupils: Pupils are equal, round, and reactive to light.   Cardiovascular:      Rate and Rhythm: Normal rate and regular rhythm.      Pulses: Normal pulses.      Heart sounds: Normal heart sounds.   Pulmonary:      Effort: Pulmonary effort is normal.   Abdominal:      General: Bowel sounds are normal. There is no distension.      Tenderness: There is no abdominal tenderness. There is no  guarding or rebound.   Musculoskeletal:         General: Normal range of motion.      Cervical back: Normal range of motion and neck supple.   Skin:     General: Skin is warm.   Neurological:      Mental Status: She is alert and oriented to person, place, and time.      Motor: No weakness.   Psychiatric:      Comments: anxious         CRANIAL NERVES     CN III, IV, VI   Pupils are equal, round, and reactive to light.     Significant Labs: All pertinent labs within the past 24 hours have been reviewed.  Recent Lab Results         01/31/23  1344        Albumin 3.7       Alkaline Phosphatase 64       ALT 21       Anion Gap 9       AST 23       Baso # 0.01       Basophil % 0.1       BILIRUBIN TOTAL 0.5  Comment: For infants and newborns, interpretation of results should be based  on gestational age, weight and in agreement with clinical  observations.    Premature Infant recommended reference ranges:  Up to 24 hours.............<8.0 mg/dL  Up to 48 hours............<12.0 mg/dL  3-5 days..................<15.0 mg/dL  6-29 days.................<15.0 mg/dL           Comment: Values of less than 100 pg/ml are consistent with non-CHF populations.       BUN 15       Calcium 9.1       Chloride 107       CO2 21       Creatinine 0.8       Differential Method Automated       eGFR >60.0       Eos # 0.1       Eosinophil % 1.4       Glucose 96       Gran # (ANC) 4.0       Gran % 49.5       Hematocrit 36.6       Hemoglobin 11.2       Immature Grans (Abs) 0.01  Comment: Mild elevation in immature granulocytes is non specific and   can be seen in a variety of conditions including stress response,   acute inflammation, trauma and pregnancy. Correlation with other   laboratory and clinical findings is essential.         Immature Granulocytes 0.1       Lymph # 3.4       Lymph % 42.5       Magnesium 2.1       MCH 22.7       MCHC 30.6       MCV 74       Mono # 0.5       Mono % 6.4       MPV SEE COMMENT  Comment: Result not  available.       nRBC 0       Platelets 245       Potassium 4.2       PROTEIN TOTAL 7.2       RBC 4.93       RDW 20.0       Sodium 137       Troponin I High Sensitivity 34.6  Comment: Troponin results differ between methods. Do not use   results between Troponin methods interchangeably.    Alkaline Phospatase levels above 400 U/L may   cause false positive results.    Access hsTnI should not be used for patients taking   Asfotase hayes (Strensiq).         TSH 1.400       WBC 8.08               Significant Imaging: I have reviewed all pertinent imaging results/findings within the past 24 hours.    Assessment/Plan:     * Atrial flutter, paroxysmal  RVR  Pt placed on a titrating cardizem drip  Pt has has periods of palpitations prior to ED arrival and difficulty adjusting medications  Echocardiogram  Cardiology consulted  Observe on step down  NPO past midnight        VTE Risk Mitigation (From admission, onward)    None             Willie Chen MD  Department of Hospital Medicine   [facility] - Emergency Dept

## 2023-01-31 NOTE — SUBJECTIVE & OBJECTIVE
No past medical history on file.    No past surgical history on file.    Review of patient's allergies indicates:  No Known Allergies    No current facility-administered medications on file prior to encounter.     Current Outpatient Medications on File Prior to Encounter   Medication Sig    amiodarone (PACERONE) 200 MG Tab Take 200 mg by mouth 2 (two) times daily.    apixaban (ELIQUIS) 5 mg Tab Take 5 mg by mouth 2 (two) times daily.    metoprolol succinate (TOPROL-XL) 25 MG 24 hr tablet Take 12.5 mg by mouth 2 (two) times daily.     Family History    None       Tobacco Use    Smoking status: Not on file    Smokeless tobacco: Not on file   Substance and Sexual Activity    Alcohol use: Not on file    Drug use: Not on file    Sexual activity: Not on file     Review of Systems   Constitutional:  Positive for activity change, diaphoresis and fatigue. Negative for chills and fever.   HENT: Negative.     Eyes: Negative.    Respiratory:  Positive for chest tightness and shortness of breath.    Cardiovascular:  Positive for palpitations. Negative for chest pain.   Gastrointestinal: Negative.    Endocrine: Positive for heat intolerance.   Genitourinary: Negative.    Musculoskeletal:  Positive for myalgias.   Skin: Negative.    Allergic/Immunologic: Negative.    Neurological:  Positive for weakness.   Hematological: Negative.    Psychiatric/Behavioral:  Positive for dysphoric mood. The patient is nervous/anxious.    Objective:     Vital Signs (Most Recent):  Temp: 98.6 °F (37 °C) (01/31/23 1230)  Pulse: (!) 152 (01/31/23 1600)  Resp: 18 (01/31/23 1230)  BP: 133/77 (01/31/23 1541)  SpO2: 100 % (01/31/23 1600)   Vital Signs (24h Range):  Temp:  [98.6 °F (37 °C)] 98.6 °F (37 °C)  Pulse:  [151-158] 152  Resp:  [18] 18  SpO2:  [99 %-100 %] 100 %  BP: (104-142)/(66-97) 133/77     Weight: 90.7 kg (200 lb)  Body mass index is 35.43 kg/m².    Physical Exam  Vitals and nursing note reviewed.   Constitutional:       General: She is not  in acute distress.     Appearance: She is diaphoretic. She is not ill-appearing.   HENT:      Head: Normocephalic and atraumatic.      Nose: Nose normal.      Mouth/Throat:      Mouth: Mucous membranes are moist.   Eyes:      Extraocular Movements: Extraocular movements intact.      Pupils: Pupils are equal, round, and reactive to light.   Cardiovascular:      Rate and Rhythm: Normal rate and regular rhythm.      Pulses: Normal pulses.      Heart sounds: Normal heart sounds.   Pulmonary:      Effort: Pulmonary effort is normal.   Abdominal:      General: Bowel sounds are normal. There is no distension.      Tenderness: There is no abdominal tenderness. There is no guarding or rebound.   Musculoskeletal:         General: Normal range of motion.      Cervical back: Normal range of motion and neck supple.   Skin:     General: Skin is warm.   Neurological:      Mental Status: She is alert and oriented to person, place, and time.      Motor: No weakness.   Psychiatric:      Comments: anxious         CRANIAL NERVES     CN III, IV, VI   Pupils are equal, round, and reactive to light.     Significant Labs: All pertinent labs within the past 24 hours have been reviewed.  Recent Lab Results         01/31/23  1344        Albumin 3.7       Alkaline Phosphatase 64       ALT 21       Anion Gap 9       AST 23       Baso # 0.01       Basophil % 0.1       BILIRUBIN TOTAL 0.5  Comment: For infants and newborns, interpretation of results should be based  on gestational age, weight and in agreement with clinical  observations.    Premature Infant recommended reference ranges:  Up to 24 hours.............<8.0 mg/dL  Up to 48 hours............<12.0 mg/dL  3-5 days..................<15.0 mg/dL  6-29 days.................<15.0 mg/dL           Comment: Values of less than 100 pg/ml are consistent with non-CHF populations.       BUN 15       Calcium 9.1       Chloride 107       CO2 21       Creatinine 0.8       Differential Method  Automated       eGFR >60.0       Eos # 0.1       Eosinophil % 1.4       Glucose 96       Gran # (ANC) 4.0       Gran % 49.5       Hematocrit 36.6       Hemoglobin 11.2       Immature Grans (Abs) 0.01  Comment: Mild elevation in immature granulocytes is non specific and   can be seen in a variety of conditions including stress response,   acute inflammation, trauma and pregnancy. Correlation with other   laboratory and clinical findings is essential.         Immature Granulocytes 0.1       Lymph # 3.4       Lymph % 42.5       Magnesium 2.1       MCH 22.7       MCHC 30.6       MCV 74       Mono # 0.5       Mono % 6.4       MPV SEE COMMENT  Comment: Result not available.       nRBC 0       Platelets 245       Potassium 4.2       PROTEIN TOTAL 7.2       RBC 4.93       RDW 20.0       Sodium 137       Troponin I High Sensitivity 34.6  Comment: Troponin results differ between methods. Do not use   results between Troponin methods interchangeably.    Alkaline Phospatase levels above 400 U/L may   cause false positive results.    Access hsTnI should not be used for patients taking   Asfotase hayes (Strensiq).         TSH 1.400       WBC 8.08               Significant Imaging: I have reviewed all pertinent imaging results/findings within the past 24 hours.

## 2023-01-31 NOTE — ASSESSMENT & PLAN NOTE
RVR  Pt placed on a titrating cardizem drip  Pt has has periods of palpitations prior to ED arrival and difficulty adjusting medications  Echocardiogram  Cardiology consulted  Observe on step down  NPO past midnight

## 2023-02-01 VITALS
SYSTOLIC BLOOD PRESSURE: 136 MMHG | TEMPERATURE: 98 F | WEIGHT: 200 LBS | HEART RATE: 85 BPM | BODY MASS INDEX: 35.44 KG/M2 | OXYGEN SATURATION: 98 % | HEIGHT: 63 IN | DIASTOLIC BLOOD PRESSURE: 76 MMHG | RESPIRATION RATE: 20 BRPM

## 2023-02-01 PROCEDURE — 96366 THER/PROPH/DIAG IV INF ADDON: CPT

## 2023-02-01 PROCEDURE — G0378 HOSPITAL OBSERVATION PER HR: HCPCS

## 2023-02-01 RX ORDER — METOPROLOL SUCCINATE 25 MG/1
25 TABLET, EXTENDED RELEASE ORAL 2 TIMES DAILY
Qty: 60 TABLET | Refills: 0 | Status: SHIPPED | OUTPATIENT
Start: 2023-02-01 | End: 2023-08-30

## 2023-02-01 NOTE — DISCHARGE SUMMARY
"[facility]  Discharge Summary  Patient Name: Florence Hardy MRN: [id number]   Patient Class: OP- Observation  Length of Stay: 0   Admission Date: 1/31/2023 12:29 PM Attending Physician: Alverto Flores MD   Primary Care Provider: Karen Alonso NP Face-to-Face encounter date: 02/01/2023   Chief Complaint: Palpitations (Hx of a fib, on elliquis)    Date of Discharge: 2/1/2023  Discharge Disposition:Home or Self Care    Condition: Stable       Reason for Hospitalization     Active Hospital Problems    Diagnosis    *Atrial flutter, paroxysmal         Brief History of Present Illness    Florence Hardy is a 47 y.o.  female who  has no past medical history on file.. The patient presented to [facility] on 1/31/2023 with a primary complaint of Palpitations (Hx of a fib, on elliquis)  .     For the full HPI please refer to the History & Physical from this admission.    Hospital Course By Problem with Pertinent Findings     Admitted for atrial flutter with RVR. Patient was given cardiazem infusion with conversion to NSR within few hours. Patient off Cardizem for 12 hours. Patient would like to be discharged home and follow up with her cardiologist as outpatient. Advised EP referral. Metoprolol increased to 25mg BID before discharge.     Patient was seen and examined on the date of discharge and determined to be suitable for discharge.    Physical Exam  BP (!) 142/71 (BP Location: Left arm, Patient Position: Lying)   Pulse 78   Temp 98.4 °F (36.9 °C) (Oral)   Resp 20   Ht 5' 3" (1.6 m)   Wt 90.7 kg (200 lb)   LMP  (LMP Unknown)   SpO2 98%   Breastfeeding No   BMI 35.43 kg/m²   Vitals reviewed.    Constitutional: No distress.   HENT: Atraumatic.   Cardiovascular: Normal rate, regular rhythm and normal heart sounds.   Pulmonary/Chest: Effort normal. Clear to auscultation bilaterally. No wheezes.   Abdominal: Soft. Bowel sounds are normal. Exhibits no distension and no mass. " No tenderness  Neurological: Alert.   Skin: Skin is warm and dry.     Following labs were Reviewed   Recent Labs   Lab 01/31/23  1344   WBC 8.08   HGB 11.2*   HCT 36.6*      CALCIUM 9.1   ALBUMIN 3.7   PROT 7.2      K 4.2   CO2 21*      BUN 15   CREATININE 0.8   ALKPHOS 64   ALT 21   AST 23   BILITOT 0.5     No results found for: POCTGLUCOSE     All labs within the past 24 hours have been reviewed    Microbiology Results (last 7 days)       ** No results found for the last 168 hours. **          X-Ray Chest 1 View   Final Result          No results found for this or any previous visit.      Consultants and Procedures   Consultants:  Consults (From admission, onward)          Status Ordering Provider     Inpatient consult to Cardiology  Once        Provider:  (Not yet assigned)    Acknowledged WILDER CHEN     Inpatient consult to Hospitalist  Once        Provider:  Wilder Chen MD    Acknowledged NOHEMI FALLON            Procedures:   * No surgery found *     Discharge Information:   Diet:  Resume regular diet    Physical Activity:  Activity as tolerated    Instructions:  1. Take all medications as prescribed  2. Keep all follow-up appointments  3. Return to the hospital or call your primary care physicians if any worsening symptoms such as chest pain, shortness of breath, palpitations occur.    Follow-Up Appointments:  Please call your primary care physician to schedule an appointment in 1 week time.     Follow-up Information       Karen Alonso NP Follow up in 1 week(s).    Specialty: Family Medicine  Contact information:  [address] [place] [address] [phone]               cardiologist Follow up in 1 week(s).                               Pending laboratory work/Tests to be performed/followed by the Primary care Physician: none    The patient was discharged in the care of her parents//wife/family/caregiver, with discharge instructions were reviewed in  written and verbal form. All pertinent questions were discussed and prescriptions were provided. The importance of making follow up appointments and compliance of medications has been stressed repeatedly. The patient will follow up in 1 week or sooner as needed with the PCP, and the patient is on board with the plan. Upon discharge, patient needs to be on following medications.    Discharge Medications:     Medication List        CHANGE how you take these medications      metoprolol succinate 25 MG 24 hr tablet  Commonly known as: TOPROL-XL  Take 1 tablet (25 mg total) by mouth 2 (two) times daily.  What changed: how much to take            CONTINUE taking these medications      amiodarone 200 MG Tab  Commonly known as: PACERONE     ELIQUIS 5 mg Tab  Generic drug: apixaban               Where to Get Your Medications        These medications were sent to [facility]  [address], [place] [address]      Hours: Mon-Fri, 8a-5:30p Phone: [phone]   metoprolol succinate 25 MG 24 hr tablet           I spent 30 minutes preparing the discharge including reviewing records from previous encounters, preparation of discharge summary, assessing and final examination of the patient, discharge medicine reconciliation, discussing plan of care, follow up and education and prescriptions.       Alverto Flores  [facility] Hospitalist  02/01/2023

## 2023-02-01 NOTE — NURSING
Patient discharged safely in stable condition without any complications. VSS. NAD noted. AAOX4. Patient voiced understanding of all discharge instructions, follow up appointment compliance, and prescription compliance. Patient states she has full bottle of new prescription at home already. All patients personal belongings taken home with patient.

## 2023-02-01 NOTE — PLAN OF CARE
02/01/23 1013   Final Note   Assessment Type Final Discharge Note   Anticipated Discharge Disposition Home   What phone number can be called within the next 1-3 days to see how you are doing after discharge? [phone]   Hospital Resources/Appts/Education Provided Provided patient/caregiver with written discharge plan information;Post-Acute resouces added to AVS   Post-Acute Status   Discharge Delays None known at this time     Discharge orders and chart reviewed. No other discharge needs noted at this time. Pt is clear for discharge from case management. Pt is discharging to home.    Patient to follow up with OP cardiologist at [facility].

## 2023-08-16 ENCOUNTER — PATIENT OUTREACH (OUTPATIENT)
Dept: ADMINISTRATIVE | Facility: HOSPITAL | Age: 48
End: 2023-08-16

## 2023-08-16 NOTE — PROGRESS NOTES
Health Maintenance Due   Topic Date Due    HIV Screening  Never done    TETANUS VACCINE  Never done    Mammogram  12/05/2019    COVID-19 Vaccine (3 - Pfizer series) 11/03/2021    Cervical Cancer Screening  05/25/2022    Colorectal Cancer Screening  Never done    Lipid Panel  04/05/2023        Chart review done.    updated.   Immunizations reviewed & updated.   Care Everywhere updated.   [facility] reviewed   DIS reviewed

## 2023-08-30 ENCOUNTER — OFFICE VISIT (OUTPATIENT)
Dept: PRIMARY CARE CLINIC | Facility: CLINIC | Age: 48
End: 2023-08-30

## 2023-08-30 DIAGNOSIS — I48.92 ATRIAL FLUTTER, PAROXYSMAL: ICD-10-CM

## 2023-08-30 DIAGNOSIS — F33.1 MODERATE EPISODE OF RECURRENT MAJOR DEPRESSIVE DISORDER: ICD-10-CM

## 2023-08-30 DIAGNOSIS — Z00.00 ANNUAL PHYSICAL EXAM: ICD-10-CM

## 2023-08-30 DIAGNOSIS — R42 DIZZINESS: Primary | ICD-10-CM

## 2023-08-30 PROCEDURE — 99214 PR OFFICE/OUTPT VISIT, EST, LEVL IV, 30-39 MIN: ICD-10-PCS | Mod: 95,,, | Performed by: NURSE PRACTITIONER

## 2023-08-30 PROCEDURE — 99214 OFFICE O/P EST MOD 30 MIN: CPT | Mod: 95,,, | Performed by: NURSE PRACTITIONER

## 2023-08-30 RX ORDER — CLONAZEPAM 0.5 MG/1
0.5 TABLET ORAL DAILY PRN
Qty: 15 TABLET | Refills: 0 | Status: SHIPPED | OUTPATIENT
Start: 2023-08-30 | End: 2024-08-29

## 2023-08-30 RX ORDER — ESCITALOPRAM OXALATE 10 MG/1
10 TABLET ORAL DAILY
Qty: 30 TABLET | Refills: 2 | Status: SHIPPED | OUTPATIENT
Start: 2023-08-30 | End: 2023-10-12 | Stop reason: SDUPTHER

## 2023-08-30 NOTE — PROGRESS NOTES
"Chief Complaint  Chief Complaint   Patient presents with    Night Sweats       The patient location is:  [place]  The chief complaint leading to consultation is:  Dizziness, diaphoresis    Visit type: audiovisual    Face to Face time with patient: 25  25 minutes of total time spent on the encounter, which includes face to face time and non-face to face time preparing to see the patient (eg, review of tests), Obtaining and/or reviewing separately obtained history, Documenting clinical information in the electronic or other health record, Independently interpreting results (not separately reported) and communicating results to the patient/family/caregiver, or Care coordination (not separately reported).         Each patient to whom he or she provides medical services by telemedicine is:  (1) informed of the relationship between the physician and patient and the respective role of any other health care provider with respect to management of the patient; and (2) notified that he or she may decline to receive medical services by telemedicine and may withdraw from such care at any time.    Notes:    SCOOTER Hardy is a 47 y.o. female that presents for dizziness, diaphoresis, atrial fibrillation.  Patient with multiple medical complaints at this time.  Due for health maintenance, patient without insurance until October.    For the past 6 mos the patient has been having "dizzy spells". Feels weak and like she is going to pass out. The first time it happened she was sitting on the toilet. Felt like the room was spinning. She started sweating and got pale. Laid in the bed and it passed on its own. Happened a few months later while sitting at her desk at work. Recently she has noticed SOB with exertion. Gets winded easily, once she rests she recovers but she feels drained. Patient with a h/o atrial fibrillation though it does not feel like her heart is racing. Her atrial fibrillation feels different. Did notice " that her ankles swell with extended periods of time though it is just a little.  Not currently seeing a cardiologist. Patient lives in [place] and has been unable to establish. Patient without a history of DM or hypoglycemia. No BP cuff or pulse ox at home. Patient does have a history of anxiety has been taking lexapro 10 mg though she stopped it after hospitalization.     Patient without insurance at this time. Upcoming appointment with cardiology in October after she gets on her husbands insurance.         PAST MEDICAL HISTORY:  Past Medical History:   Diagnosis Date    Anxiety        PAST SURGICAL HISTORY:  No past surgical history on file.    SOCIAL HISTORY:  Social History     Socioeconomic History    Marital status:    Tobacco Use    Smokeless tobacco: Never   Substance and Sexual Activity    Alcohol use: No    Drug use: No    Sexual activity: Yes     Partners: Male   Social History Narrative    ** Merged History Encounter **            FAMILY HISTORY:  Family History   Problem Relation Age of Onset    Ovarian cancer Cousin     Breast cancer Neg Hx     Colon cancer Neg Hx        ALLERGIES AND MEDICATIONS: updated and reviewed.  Review of patient's allergies indicates:  No Known Allergies  Current Outpatient Medications   Medication Sig Dispense Refill    clonazePAM (KLONOPIN) 0.5 MG tablet Take 1 tablet (0.5 mg total) by mouth daily as needed for Anxiety. 15 tablet 0    EScitalopram oxalate (LEXAPRO) 10 MG tablet Take 1 tablet (10 mg total) by mouth once daily. 30 tablet 2    naproxen (NAPROSYN) 500 MG tablet Take 1 tablet (500 mg total) by mouth 2 (two) times daily. 28 tablet 0     No current facility-administered medications for this visit.         ROS  Review of Systems   Constitutional:  Positive for diaphoresis and fatigue. Negative for chills and fever.   HENT:  Negative for ear pain, postnasal drip and sinus pain.    Respiratory:  Negative for cough and shortness of breath.    Cardiovascular:   Negative for chest pain and palpitations.   Gastrointestinal:  Negative for diarrhea, nausea and vomiting.   Neurological:  Positive for dizziness and light-headedness. Negative for syncope.   Psychiatric/Behavioral:  Positive for sleep disturbance. The patient is nervous/anxious.    PHYSICAL EXAM    Physical Exam  Constitutional:       General: She is not in acute distress.     Appearance: She is not ill-appearing.   HENT:      Head: Normocephalic.   Pulmonary:      Effort: Pulmonary effort is normal.   Skin:     General: Skin is warm.   Neurological:      Mental Status: She is alert. Mental status is at baseline.   Psychiatric:         Mood and Affect: Mood normal.           Health Maintenance         Date Due Completion Date    HIV Screening Never done ---    TETANUS VACCINE Never done ---    Mammogram 12/05/2019 12/5/2018    COVID-19 Vaccine (3 - Pfizer series) 11/03/2021 9/8/2021    Cervical Cancer Screening 05/25/2022 5/25/2017    Colorectal Cancer Screening Never done ---    Lipid Panel 04/05/2023 4/5/2022    Influenza Vaccine (1) 09/01/2023 12/6/2018    Hemoglobin A1c (Diabetic Prevention Screening) 04/04/2025 4/4/2022              Assessment & Plan    Problem List Items Addressed This Visit          Unprioritized    Atrial flutter, paroxysmal  Discussed with patient my concern that we need to see each other in person to further explore her complaint-in light of her history of atrial flutter.  I would like to get an EKG.  Without a blood pressure heart rate monitor at home.  No Apple watch.  Agreeable to go to her local drugstore and check her blood pressure and heart rate and message me on the portal.    Keep upcoming appointment with Cardiology in October.      Episode of recurrent major depressive disorder    Relevant Medications    clonazePAM (KLONOPIN) 0.5 MG tablet     Other Visit Diagnoses       Dizziness    -  Primary    Relevant Orders    CBC Auto Differential    Comprehensive Metabolic Panel     Lipid Panel    TSH    Hemoglobin A1C    HIV 1/2 Ag/Ab (4th Gen)    Difficult to determine via virtual visit and without insurance.  Basic lab work today.  Sent to [facility] to reduce cost.  Including BNP, BNP, CBC, lipid, thyroid.  Would benefit from a Holter monitor.  Willing to see Cardiology.  EKG to complete in clinic.  Hypoglycemia versus arrhythmia versus anxiety versus thyroid issue    Restart Lexapro.  Klonopin for breakthrough anxiety.    Annual physical exam                Follow-up: Follow up in about 2 weeks (around 9/13/2023) for via virtual visit.    Karen Alonso    Medication List with Changes/Refills   Current Medications    NAPROXEN (NAPROSYN) 500 MG TABLET    Take 1 tablet (500 mg total) by mouth 2 (two) times daily.   Changed and/or Refilled Medications    Modified Medication Previous Medication    CLONAZEPAM (KLONOPIN) 0.5 MG TABLET clonazePAM (KLONOPIN) 0.5 MG tablet       Take 1 tablet (0.5 mg total) by mouth daily as needed for Anxiety.    Take 1 tablet (0.5 mg total) by mouth daily as needed for Anxiety.    ESCITALOPRAM OXALATE (LEXAPRO) 10 MG TABLET EScitalopram oxalate (LEXAPRO) 10 MG tablet       Take 1 tablet (10 mg total) by mouth once daily.    Take 1 tablet by mouth once daily   Discontinued Medications    AMIODARONE (PACERONE) 200 MG TAB    Take 200 mg by mouth 2 (two) times daily.    APIXABAN (ELIQUIS) 5 MG TAB    Take 5 mg by mouth 2 (two) times daily.    ESCITALOPRAM OXALATE (LEXAPRO) 10 MG TABLET    Take 1 tablet (10 mg total) by mouth once daily.    METOPROLOL SUCCINATE (TOPROL-XL) 25 MG 24 HR TABLET    Take 1 tablet (25 mg total) by mouth 2 (two) times daily.

## 2023-08-31 ENCOUNTER — TELEPHONE (OUTPATIENT)
Dept: PRIMARY CARE CLINIC | Facility: CLINIC | Age: 48
End: 2023-08-31

## 2023-08-31 NOTE — TELEPHONE ENCOUNTER
----- Message from [place] sent at 8/31/2023  2:07 PM CDT -----  Contact: Pt [phone]  Patient is returning a phone call.  Who left a message for the patient: Liliya  Does patient know what this is regarding:  Yes, per [facility] she need the diagnoses codes the labs they received from your office in order to give her a quote of how much it will cost.   Would you like a call back, or a response through your [facility] portal?:   Either

## 2023-08-31 NOTE — TELEPHONE ENCOUNTER
----- Message from Mia Espinosa sent at 8/31/2023 12:09 PM CDT -----  Contact: [phone] Patient  Pt is calling in regards to her lab orders sent to [facility]. Pt stated she needs the codes so she can give them to [facility] so they can give her a price on how much it will cost for her lab work. Please call and advise. Thank you.

## 2023-08-31 NOTE — TELEPHONE ENCOUNTER
----- Message from Mia Espinosa sent at 8/31/2023  2:59 PM CDT -----  Contact: [phone] Patient  Patient is returning a phone call.  Who left a message for the patient: Liliya Tapia  Does patient know what this is regarding:  Yes  Would you like a call back, or a response through your [facility] portal?:   Call Back Please  Comments:

## 2023-09-18 ENCOUNTER — PATIENT MESSAGE (OUTPATIENT)
Dept: PRIMARY CARE CLINIC | Facility: CLINIC | Age: 48
End: 2023-09-18

## 2023-09-30 ENCOUNTER — PATIENT MESSAGE (OUTPATIENT)
Dept: ADMINISTRATIVE | Facility: HOSPITAL | Age: 48
End: 2023-09-30

## 2023-10-17 RX ORDER — ESCITALOPRAM OXALATE 10 MG/1
10 TABLET ORAL DAILY
Qty: 30 TABLET | Refills: 2 | Status: SHIPPED | OUTPATIENT
Start: 2023-10-17

## 2023-10-18 ENCOUNTER — PATIENT MESSAGE (OUTPATIENT)
Dept: CARDIOLOGY | Facility: CLINIC | Age: 48
End: 2023-10-18

## 2023-10-20 RX ORDER — ESCITALOPRAM OXALATE 10 MG/1
10 TABLET ORAL DAILY
Qty: 90 TABLET | Refills: 3 | Status: SHIPPED | OUTPATIENT
Start: 2023-10-20

## 2024-02-06 DIAGNOSIS — Z12.11 COLON CANCER SCREENING: ICD-10-CM
